# Patient Record
Sex: MALE | Race: WHITE | NOT HISPANIC OR LATINO | Employment: FULL TIME | URBAN - METROPOLITAN AREA
[De-identification: names, ages, dates, MRNs, and addresses within clinical notes are randomized per-mention and may not be internally consistent; named-entity substitution may affect disease eponyms.]

---
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
## 2023-06-09 ENCOUNTER — HOSPITAL ENCOUNTER (EMERGENCY)
Facility: HOSPITAL | Age: 23
Discharge: HOME | End: 2023-06-09
Attending: EMERGENCY MEDICINE | Admitting: EMERGENCY MEDICINE
Payer: COMMERCIAL

## 2023-06-09 VITALS
OXYGEN SATURATION: 99 % | RESPIRATION RATE: 20 BRPM | TEMPERATURE: 97.8 F | HEART RATE: 83 BPM | DIASTOLIC BLOOD PRESSURE: 80 MMHG | HEIGHT: 74 IN | WEIGHT: 190 LBS | BODY MASS INDEX: 24.38 KG/M2 | SYSTOLIC BLOOD PRESSURE: 138 MMHG

## 2023-06-09 DIAGNOSIS — L30.9 DERMATITIS: ICD-10-CM

## 2023-06-09 PROCEDURE — 99283 EMERGENCY DEPT VISIT LOW MDM: CPT

## 2023-06-09 RX ORDER — TRIAMCINOLONE ACETONIDE 1 MG/G
OINTMENT TOPICAL 2 TIMES DAILY
Qty: 30 G | Refills: 0 | Status: SHIPPED | OUTPATIENT
Start: 2023-06-09

## 2023-06-09 NOTE — ED TRIAGE NOTES
He skinned knuckles on right hand a few days ago, has a red line going up right forearm. [place] and is worried he was exposed to bacteria and parasites. He tried a skin cream on line going up forearm.     Triage Assessment     Row Name 06/09/23 0005       Triage Assessment (Adult)    Airway WDL WDL       Respiratory WDL    Respiratory WDL WDL       Skin Circulation/Temperature WDL    Skin Circulation/Temperature WDL WDL       Cardiac WDL    Cardiac WDL WDL       Peripheral/Neurovascular WDL    Peripheral Neurovascular WDL WDL       Cognitive/Neuro/Behavioral WDL    Cognitive/Neuro/Behavioral WDL WDL

## 2023-06-09 NOTE — ED PROVIDER NOTES
EMERGENCY DEPARTMENT ENCOUNTER      NAME: Allen Abraham  AGE: 22 year old male  YOB: 2000  MRN: [id number]  EVALUATION DATE & TIME: 6/9/2023 12:08 AM    PCP: No primary care provider on file.    ED PROVIDER: Evelin Navarrete MD    Chief Complaint   Patient presents with     Blood poisoning?         FINAL IMPRESSION:  1. Dermatitis          ED COURSE & MEDICAL DECISION MAKING:    Pertinent Labs & Imaging studies reviewed. (See chart for details)  22 year old male with history of otherwise healthy who presents to the Emergency Department for evaluation of itchy rash to the right volar forearm.  On examination he has a macular rash with excoriations to the right volar forearm consistent with a dermatitis.  This may be a swimmer's itch given the recent fresh water exposure.  There is no streaking erythema to suspect a lymphangitis and certainly no signs of cellulitis on examination.  Patient does have abrasions to the knuckles on the hands bilaterally, but none of these look infected.  Patient was given prescription for triamcinolone to apply to the affected area.  Does not have PCP's, referral was provided.  Discharged home.      ED Course as of 06/09/23 0429   Fri Jun 09, 2023   0018 I met with the patient, obtained history, performed an initial exam, and discussed options and plan for diagnostics and treatment here in the ED.   0024 I discussed the plan for discharge with the patient, and patient is agreeable. We discussed supportive cares at home and reasons for return to the ER including new or worsening symptoms - all questions and concerns addressed. Patient to be discharged by RN.           Medical Decision Making    History:    Supplemental history from: Documented in chart, if applicable    External Record(s) reviewed: Documented in chart, if applicable.    Work Up:    Chart documentation includes differential considered and any EKGs or imaging independently interpreted by provider, where  specified.    In additional to work up documented, I considered the following work up: See MDM    External consultation:    Discussion of management with another provider: N/A    Complicating factors:    Care impacted by chronic illness: N/A    Care affected by social determinants of health: Access to care    Disposition considerations: Discharge. I prescribed additional prescription strength medication(s) as charted. See documentation for any additional details.        At the conclusion of the encounter I discussed the results of all of the tests and the disposition. The questions were answered. The patient or family acknowledged understanding and was agreeable with the care plan.      MEDICATIONS GIVEN IN THE EMERGENCY:  Medications - No data to display    NEW PRESCRIPTIONS STARTED AT TODAY'S ER VISIT  Discharge Medication List as of 6/9/2023 12:40 AM      START taking these medications    Details   triamcinolone (KENALOG) 0.1 % external ointment Apply topically 2 times dailyDisp-30 g, R-0Local Print                =================================================================    HPI    Patient information was obtained from: Patient     Use of Intrepreter: N/A     Allen Abraham is a 22 year old male with no pertinent medical history who presents with rash. Patient presents with erythematous line on his right forearm onset 1500 this afternoon after going swimming earlier. He reports swimming in [place], [employer]. Denies exposures to new allergens. Denies having rash in other parts of his body. Patient endorses abrasions to his right knuckles from an outburst he had a few days ago. Patient denies any additional complaints at this time.       PAST MEDICAL HISTORY:  History reviewed. No pertinent past medical history.    PAST SURGICAL HISTORY:  History reviewed. No pertinent surgical history.    CURRENT MEDICATIONS:    None       ALLERGIES:  No Known Allergies    FAMILY HISTORY:  Family History  "  Problem Relation Age of Onset     Asthma Mother      Hyperlipidemia Father      Heart Disease Maternal Uncle 47.00        MI     No Known Problems Maternal Grandmother      Cerebrovascular Disease Maternal Grandfather      Heart Disease Paternal Grandfather         MI     Hyperlipidemia Paternal Grandfather        SOCIAL HISTORY:  Social History     Substance Use Topics     Alcohol use: No     Drug use: No        VITALS:  Patient Vitals for the past 24 hrs:   BP Temp Temp src Pulse Resp SpO2 Height Weight   06/09/23 0003 138/80 97.8  F (36.6  C) Temporal 83 20 99 % 1.88 m (6' 2\") 86.2 kg (190 lb)       PHYSICAL EXAM    General Appearance: Well-appearing, well-nourished, no acute distress   Cardio:  Regular rate and rhythm  Pulm:  No respiratory distress  Extremities:  Extremities normal, abrasions to right knuckles, right volar wrist with erythematous patches with excoriations. No induration or streaking erythema., no cyanosis or edema, full ROM and motor tone intact, bilateral pulses intact upper and lower  Skin:  Skin warm, dry  Neuro:  Alert and oriented ×3        The creation of this record is based on the scribe s observations of the work being performed by Evelin Navarrete MD and the provider s statements to them. It was created on her behalf by Christopher Palm, a trained medical scribe. This document has been checked and approved by the attending provider.    Evelin Navarrete MD  Emergency Medicine  [facility] EMERGENCY DEPARTMENT  [address] [address]  [phone]  Dept: [phone]     Evelin Navarrete MD  06/09/23 0431    "